# Patient Record
Sex: MALE | Race: BLACK OR AFRICAN AMERICAN | NOT HISPANIC OR LATINO | URBAN - METROPOLITAN AREA
[De-identification: names, ages, dates, MRNs, and addresses within clinical notes are randomized per-mention and may not be internally consistent; named-entity substitution may affect disease eponyms.]

---

## 2021-01-09 ENCOUNTER — EMERGENCY (EMERGENCY)
Facility: HOSPITAL | Age: 41
LOS: 1 days | Discharge: HOME | End: 2021-01-09
Admitting: EMERGENCY MEDICINE
Payer: OTHER MISCELLANEOUS

## 2021-01-09 VITALS
RESPIRATION RATE: 18 BRPM | HEIGHT: 67 IN | DIASTOLIC BLOOD PRESSURE: 85 MMHG | OXYGEN SATURATION: 100 % | WEIGHT: 220.46 LBS | SYSTOLIC BLOOD PRESSURE: 139 MMHG | TEMPERATURE: 98 F | HEART RATE: 79 BPM

## 2021-01-09 DIAGNOSIS — M54.5 LOW BACK PAIN: ICD-10-CM

## 2021-01-09 PROCEDURE — 99284 EMERGENCY DEPT VISIT MOD MDM: CPT

## 2021-01-09 PROCEDURE — 99053 MED SERV 10PM-8AM 24 HR FAC: CPT

## 2021-01-09 PROCEDURE — 96372 THER/PROPH/DIAG INJ SC/IM: CPT

## 2021-01-09 PROCEDURE — 99284 EMERGENCY DEPT VISIT MOD MDM: CPT | Mod: 25

## 2021-01-09 RX ORDER — CYCLOBENZAPRINE HYDROCHLORIDE 10 MG/1
10 TABLET, FILM COATED ORAL ONCE
Refills: 0 | Status: COMPLETED | OUTPATIENT
Start: 2021-01-09 | End: 2021-01-09

## 2021-01-09 RX ORDER — IBUPROFEN 200 MG
1 TABLET ORAL
Qty: 15 | Refills: 0
Start: 2021-01-09 | End: 2021-01-13

## 2021-01-09 RX ORDER — CYCLOBENZAPRINE HYDROCHLORIDE 10 MG/1
1 TABLET, FILM COATED ORAL
Qty: 15 | Refills: 0
Start: 2021-01-09 | End: 2021-01-13

## 2021-01-09 RX ORDER — LIDOCAINE 4 G/100G
1 CREAM TOPICAL
Qty: 7 | Refills: 0
Start: 2021-01-09 | End: 2021-01-15

## 2021-01-09 RX ORDER — KETOROLAC TROMETHAMINE 30 MG/ML
60 SYRINGE (ML) INJECTION ONCE
Refills: 0 | Status: DISCONTINUED | OUTPATIENT
Start: 2021-01-09 | End: 2021-01-09

## 2021-01-09 RX ORDER — LIDOCAINE 4 G/100G
1 CREAM TOPICAL ONCE
Refills: 0 | Status: COMPLETED | OUTPATIENT
Start: 2021-01-09 | End: 2021-01-09

## 2021-01-09 RX ADMIN — CYCLOBENZAPRINE HYDROCHLORIDE 10 MILLIGRAM(S): 10 TABLET, FILM COATED ORAL at 07:41

## 2021-01-09 RX ADMIN — Medication 60 MILLIGRAM(S): at 07:41

## 2021-01-09 RX ADMIN — LIDOCAINE 1 PATCH: 4 CREAM TOPICAL at 07:41

## 2021-01-09 NOTE — ED PROVIDER NOTE - CARE PROVIDERS DIRECT ADDRESSES
,DirectAddress_Unknown,DirectAddress_Unknown,[email or web address]

## 2021-01-09 NOTE — ED PROVIDER NOTE - NSFOLLOWUPINSTRUCTIONS_ED_ALL_ED_FT
REST, USE HEATING PAD, AVOID HEAVY LIFTING/PUSHING/PULLING, TAKE MEDICATION AS DISCUSSED, FOLLOW UP WITH SPINE DOCTOR IN A WK IF SYMPTOMS PERSIST    Back Pain    Back pain is very common in adults. The cause of back pain is rarely dangerous and the pain often gets better over time. The cause of your back pain may not be known and may include strain of muscles or ligaments, degeneration of the spinal disks, or arthritis. Occasionally the pain may radiate down your leg(s). Over-the-counter medicines to reduce pain and inflammation are often the most helpful. Stretching and remaining active frequently helps the healing process.     SEEK IMMEDIATE MEDICAL CARE IF YOU HAVE ANY OF THE FOLLOWING SYMPTOMS: bowel or bladder control problems, unusual weakness or numbness in your arms or legs, nausea or vomiting, abdominal pain, fever, dizziness/lightheadedness.

## 2021-01-09 NOTE — ED PROVIDER NOTE - PATIENT PORTAL LINK FT
You can access the FollowMyHealth Patient Portal offered by [facility] by registering at the following website: [email or web address]. By joining [employer]’s FollowMyHealth portal, you will also be able to view your health information using other applications (apps) compatible with our system.

## 2021-01-09 NOTE — ED PROVIDER NOTE - CARE PROVIDER_API CALL
Mine Oliveros)  Orthopaedic Surgery  [address] [place] [address]  Phone: [phone]  Fax: [phone]  Follow Up Time:     Chaparro Carlson)  Orthopaedic Surgery; Spine Surgery  [address] [place] [address]  Phone: [phone]  Fax: [phone]  Follow Up Time:     Tim Munoz)  Orthopaedic Surgery  [address], [place] [address]  Phone: [phone]  Fax: [phone]  Follow Up Time:

## 2021-01-09 NOTE — ED PROVIDER NOTE - CLINICAL SUMMARY MEDICAL DECISION MAKING FREE TEXT BOX
pt c/o lbp s/p lifting a heavy bag, no blunt trauma/no fall, no spinal tend on exam - para lumbar tend, likely msk / strain / spasm, no concern for fx, no findings of cord compression or cauda equina, given meds in ed w/good relief, no indication for any emergent imaging at this time, pt understands and agrees w/plan, strict return precautions given

## 2021-01-09 NOTE — ED ADULT NURSE NOTE - OBJECTIVE STATEMENT
Patient is a , endorsed that he was working this morning as usual, picking up trash and placing into truck when he injured L arm and mid lower back, present to the ED with pain to L arm and back, denies numbness tingling to extremities, bowel or bladder disfunction or any other symptoms.  Patient denies any medical, surgical history, takes no medication.

## 2021-01-09 NOTE — ED PROVIDER NOTE - PROVIDER TOKENS
PROVIDER:[TOKEN:[[id number]]],PROVIDER:[TOKEN:[[id number]]],PROVIDER:[TOKEN:[[id number]]]

## 2021-01-09 NOTE — ED PROVIDER NOTE - OBJECTIVE STATEMENT
The pt is a 41 y/o M, who presents to ED c/o LBP after lifting a heavy garbage back ~ 6am while working. Pain is 2/10, constant, dull, aggravated w/certain positions and movement, has not taken any pain meds, pain radiates to r leg, arm feels "sore". Denies blunt trauma, fall, numbness or tingling to toes, cp, sob, decreased ROM, inability to walk